# Patient Record
Sex: FEMALE | Race: BLACK OR AFRICAN AMERICAN | NOT HISPANIC OR LATINO | URBAN - METROPOLITAN AREA
[De-identification: names, ages, dates, MRNs, and addresses within clinical notes are randomized per-mention and may not be internally consistent; named-entity substitution may affect disease eponyms.]

---

## 2023-05-24 RX ORDER — CHLORHEXIDINE GLUCONATE 213 G/1000ML
1 SOLUTION TOPICAL DAILY
Refills: 0 | Status: DISCONTINUED | OUTPATIENT
Start: 2023-05-25 | End: 2023-05-25

## 2023-05-24 NOTE — ASU PATIENT PROFILE, ADULT - NSICDXPASTMEDICALHX_GEN_ALL_CORE_FT
PAST MEDICAL HISTORY:  Deep vein thrombosis (DVT) history of    ESRD on dialysis tuesday - thursday - friday last dialysis was on Wednesday    H/O: gout     Hypertension     Type II diabetes mellitus

## 2023-05-24 NOTE — ASU PATIENT PROFILE, ADULT - NSICDXPASTSURGICALHX_GEN_ALL_CORE_FT
PAST SURGICAL HISTORY:  Heel spur, unspecified laterality bilateral    History of insertion of tunneled central venous catheter (CVC) with port     S/P arteriovenous (AV) fistula creation

## 2023-05-24 NOTE — ASU PATIENT PROFILE, ADULT - NS PREOP UNDERSTANDS INFO
No solid food/dairy/candy/gum after midnight, water allowed before 09:00am tomorrow; patient to bring photo ID/Insurance card; dress in comfortable clothes; no jewelries/contact lens/valuables; no smoking/alcohol/recreational drug use today; escort to come with photo ID; address and callback number was given/yes

## 2023-05-25 ENCOUNTER — OUTPATIENT (OUTPATIENT)
Dept: OUTPATIENT SERVICES | Facility: HOSPITAL | Age: 73
LOS: 1 days | Discharge: HOME | End: 2023-05-25

## 2023-05-25 VITALS
HEART RATE: 65 BPM | RESPIRATION RATE: 16 BRPM | SYSTOLIC BLOOD PRESSURE: 130 MMHG | DIASTOLIC BLOOD PRESSURE: 62 MMHG | OXYGEN SATURATION: 97 %

## 2023-05-25 VITALS
DIASTOLIC BLOOD PRESSURE: 79 MMHG | WEIGHT: 177.47 LBS | TEMPERATURE: 97 F | RESPIRATION RATE: 16 BRPM | SYSTOLIC BLOOD PRESSURE: 153 MMHG | OXYGEN SATURATION: 98 % | HEIGHT: 69 IN | HEART RATE: 73 BPM

## 2023-05-25 DIAGNOSIS — Z98.890 OTHER SPECIFIED POSTPROCEDURAL STATES: Chronic | ICD-10-CM

## 2023-05-25 DIAGNOSIS — M77.30 CALCANEAL SPUR, UNSPECIFIED FOOT: Chronic | ICD-10-CM

## 2023-05-25 LAB
ISTAT VENOUS BE: 5 MMOL/L — HIGH (ref -2–3)
ISTAT VENOUS GLUCOSE: 90 MG/DL — SIGNIFICANT CHANGE UP (ref 70–99)
ISTAT VENOUS HCO3: 31 MMOL/L — HIGH (ref 23–28)
ISTAT VENOUS HEMATOCRIT: 47 % — HIGH (ref 34.5–45)
ISTAT VENOUS HEMOGLOBIN: 16 GM/DL — HIGH (ref 11.5–15.5)
ISTAT VENOUS IONIZED CALCIUM: 1.07 MMOL/L — LOW (ref 1.12–1.3)
ISTAT VENOUS PCO2: 49 MMHG — SIGNIFICANT CHANGE UP (ref 41–51)
ISTAT VENOUS PH: 7.41 — SIGNIFICANT CHANGE UP (ref 7.31–7.41)
ISTAT VENOUS PO2: 88 MMHG — HIGH (ref 35–40)
ISTAT VENOUS POTASSIUM: 4.7 MMOL/L — SIGNIFICANT CHANGE UP (ref 3.5–5.3)
ISTAT VENOUS SO2: 97 % — SIGNIFICANT CHANGE UP
ISTAT VENOUS SODIUM: 136 MMOL/L — SIGNIFICANT CHANGE UP (ref 135–145)
ISTAT VENOUS TCO2: 32 MMOL/L — HIGH (ref 22–31)

## 2023-05-25 DEVICE — SURGICEL FIBRILLAR 4 X 4": Type: IMPLANTABLE DEVICE | Status: FUNCTIONAL

## 2023-05-25 DEVICE — CLIP APPLIER ETHICON LIGACLIP 9 3/8" SMALL: Type: IMPLANTABLE DEVICE | Status: FUNCTIONAL

## 2023-05-25 DEVICE — CLIP APPLIER ETHICON LIGACLIP 11.5" MEDIUM: Type: IMPLANTABLE DEVICE | Status: FUNCTIONAL

## 2023-05-25 RX ORDER — ONDANSETRON 8 MG/1
4 TABLET, FILM COATED ORAL ONCE
Refills: 0 | Status: DISCONTINUED | OUTPATIENT
Start: 2023-05-25 | End: 2023-05-25

## 2023-05-25 RX ORDER — CARVEDILOL PHOSPHATE 80 MG/1
1 CAPSULE, EXTENDED RELEASE ORAL
Refills: 0 | DISCHARGE

## 2023-05-25 RX ORDER — GABAPENTIN 400 MG/1
0 CAPSULE ORAL
Refills: 0 | DISCHARGE

## 2023-05-25 RX ORDER — LISINOPRIL 2.5 MG/1
1 TABLET ORAL
Refills: 0 | DISCHARGE

## 2023-05-25 RX ORDER — ASPIRIN/CALCIUM CARB/MAGNESIUM 324 MG
0 TABLET ORAL
Refills: 0 | DISCHARGE

## 2023-05-25 RX ORDER — FUROSEMIDE 40 MG
1 TABLET ORAL
Refills: 0 | DISCHARGE

## 2023-05-25 RX ORDER — NIFEDIPINE 30 MG
1 TABLET, EXTENDED RELEASE 24 HR ORAL
Refills: 0 | DISCHARGE

## 2023-05-25 RX ORDER — GLIMEPIRIDE 1 MG
1 TABLET ORAL
Refills: 0 | DISCHARGE

## 2023-05-25 RX ORDER — FENTANYL CITRATE 50 UG/ML
25 INJECTION INTRAVENOUS
Refills: 0 | Status: DISCONTINUED | OUTPATIENT
Start: 2023-05-25 | End: 2023-05-25

## 2023-05-25 RX ORDER — ACETAMINOPHEN 500 MG
1000 TABLET ORAL ONCE
Refills: 0 | Status: COMPLETED | OUTPATIENT
Start: 2023-05-25 | End: 2023-05-25

## 2023-05-25 RX ORDER — SODIUM CHLORIDE 9 MG/ML
500 INJECTION INTRAMUSCULAR; INTRAVENOUS; SUBCUTANEOUS
Refills: 0 | Status: DISCONTINUED | OUTPATIENT
Start: 2023-05-25 | End: 2023-05-25

## 2023-05-25 RX ADMIN — Medication 1000 MILLIGRAM(S): at 12:01

## 2023-05-25 RX ADMIN — CHLORHEXIDINE GLUCONATE 1 APPLICATION(S): 213 SOLUTION TOPICAL at 11:38

## 2023-05-25 NOTE — ASU PREOP CHECKLIST - ALLERGIES REVIEWED
Personalized Preventive Plan for Lauri Arechiga - 7/13/2020  Medicare offers a range of preventive health benefits. Some of the tests and screenings are paid in full while other may be subject to a deductible, co-insurance, and/or copay. Some of these benefits include a comprehensive review of your medical history including lifestyle, illnesses that may run in your family, and various assessments and screenings as appropriate. After reviewing your medical record and screening and assessments performed today your provider may have ordered immunizations, labs, imaging, and/or referrals for you. A list of these orders (if applicable) as well as your Preventive Care list are included within your After Visit Summary for your review. Other Preventive Recommendations:    · A preventive eye exam performed by an eye specialist is recommended every 1-2 years to screen for glaucoma; cataracts, macular degeneration, and other eye disorders. · A preventive dental visit is recommended every 6 months. · Try to get at least 150 minutes of exercise per week or 10,000 steps per day on a pedometer . · Order or download the FREE \"Exercise & Physical Activity: Your Everyday Guide\" from [employer]. Call 7-544.999.7371 or search [employer] online. · You need 0247-8861 mg of calcium and 0455-1548 IU of vitamin D per day. It is possible to meet your calcium requirement with diet alone, but a vitamin D supplement is usually necessary to meet this goal.  · When exposed to the sun, use a sunscreen that protects against both UVA and UVB radiation with an SPF of 30 or greater. Reapply every 2 to 3 hours or after sweating, drying off with a towel, or swimming. · Always wear a seat belt when traveling in a car. Always wear a helmet when riding a bicycle or motorcycle. done

## 2023-05-25 NOTE — ASU PREOP CHECKLIST - IDENTIFICATION BAND VERIFIED
HISTORY OF PRESENT ILLNESS  Maryanne Bell is a 34 y.o. female. Rash    The history is provided by the patient (roughened skin with small dots). This is a chronic problem. Episode onset: 3 months. The problem has been gradually worsening. The problem is associated with an unknown factor. There has been no fever. The rash is present on the left upper leg, right upper leg, left arm and right arm. The patient is experiencing no pain. Pertinent negatives include no blisters, no itching, no pain and no weeping. Risk factors: none. She has tried nothing for the symptoms. Review of Systems   Constitutional: Negative for chills and fever. Musculoskeletal: Negative for myalgias. Skin: Positive for rash. Negative for itching. Physical Exam    ASSESSMENT and PLAN  Diagnoses and all orders for this visit:    1.  Keratosis pilaris  -     REFERRAL TO DERMATOLOGY if persists        - moisturizers , humidify, See patient instructions
done

## 2023-05-25 NOTE — ASU DISCHARGE PLAN (ADULT/PEDIATRIC) - ASU DC SPECIAL INSTRUCTIONSFT
Please follow up with Dr. Durbin in office Please follow up with Dr. Durbin in office tomorrow for removal of the drain. Please no heparin with dialysis for 1 week.

## 2023-05-25 NOTE — ASU DISCHARGE PLAN (ADULT/PEDIATRIC) - CARE PROVIDER_API CALL
Sb Durbin)  Surgery; Vascular Surgery  [address], [address] [place] [address]  Phone: [phone]  Fax: [phone]  Follow Up Time:

## 2023-05-25 NOTE — ASU DISCHARGE PLAN (ADULT/PEDIATRIC) - NS MD DC FALL RISK RISK
For information on Fall & Injury Prevention, visit: [email or web address] OR  [email or web address] OR  https://www.cdc.gov/steadi/patient.html

## 2023-05-25 NOTE — BRIEF OPERATIVE NOTE - OPERATION/FINDINGS
Patient placed under LMA anesthesia and R axillary block performed. Prior brachiocephalic fistula dissected and re-tunneled superficially and medially. Incision closed in layers with vicryl and staples. Hemovac drain left in place

## 2023-09-18 NOTE — BRIEF OPERATIVE NOTE - ASSISTANT(S)
Medical screening examination initiated.  I have conducted a focused provider triage encounter, findings are as follows:    Brief history of present illness:  Pt is a 19 y.o. female who presents to the [facility] ED complaining of chest pain. Tried to see her PCP for issue who recommended that she come to the ED for evaluation.    There were no vitals filed for this visit.    Pertinent physical exam:      Brief workup plan:  Diagnostic testing    Preliminary workup initiated; this workup will be continued and followed by the physician or advanced practice provider that is assigned to the patient when roomed.  
[facility]
